# Patient Record
Sex: FEMALE | Race: WHITE | NOT HISPANIC OR LATINO | URBAN - METROPOLITAN AREA
[De-identification: names, ages, dates, MRNs, and addresses within clinical notes are randomized per-mention and may not be internally consistent; named-entity substitution may affect disease eponyms.]

---
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
## 2021-05-29 NOTE — PROGRESS NOTES
Assessment:     1. Dysmenorrhea     2. Encounter for initial prescription of contraceptive pills  Pregnancy, Urine    norgestimate-ethinyl estradiol (ORTHO TRI-CYCLEN LO, 28,) 0.18/0.215/0.25 mg-25 mcg Tab tablet   3. Acne, unspecified acne type               The use of the combined contraceptive ( estrogen and progesterone) has been fully discussed with the patient. This includes the proper method to initiate, the need for regular compliance to ensure adequate contraceptive effect, the physiology that makes the it effective, the instructions for what to do in event of missing pills, and warnings about anticipated minor side effects such as breakthrough spotting, nausea, breast tenderness, weight changes, acne, headaches, etc.    She has been told of the more serious potential side effects such as MI, stroke, and deep vein thrombosis, all of which are very unlikely.  She has been asked to report any signs of such serious problems immediately.. The need for additional protection, such as a condom, to prevent exposure to sexually transmitted diseases has also been discussed. The patient has been clearly reminded that birth control cannot protect her against diseases such as HIV and others. She understands and wishes to take the medication as prescribed.      Patient encouraged to schedule isit for follow-up of birth control method and for WCC/ immunizations , sexually transmitted infection screen if desired.      Patient has delayed immunization as she and family are against immunization after her friend had paralysis after getting immunization  I counseled about related side effects and the benefits about immunization in detail.  Patient will consider.     Plan:      Discussed the diagnosis with the patient.  Educational material distributed.  Discussed non-pharmaceutical approaches to the problem.  I started oral contraceptive pills per orders.  Follow up in 3 months or as needed.     Subjective:     Chief Complaint    Patient presents with     Establish Care     New Pt- here today to establish care with a new provider - received previous care in [place], [place]     Dysmenorrhea     Would like to discuss painful menses     Contraception     Would like to discuss BC options        Emma Salcido is a 18 y.o. female who presents for evaluation of menstrual symptoms. Symptoms began several years ago. Patient describes symptoms of breast tenderness (mild), labile mood (mild), menstrual cramping (moderate) and pelvic pain (moderate). Symptoms occur with periods, which are usually 26 days apart and quite regular. Patient denies bloating/fluid retention, insomnia, menorrhagia and migraine headaches. Evaluation to date includes: GYN evaluation (not done). Treatment to date includes: Oral contraceptive pills per medication list:(NEVER PICKED UP). The patient has never been sexually active.     Has missed school because of dysmenorrhea to the point that she was in the truancy list.  Could not get a note every time she had a painful..  Was prescribed birth control pill at another clinic that she never initiated.    Follows with a psychiatrist and is on Zoloft for anxiety.  The name of the psychiatrist is Dr. Lowry him but does not know the clinic.    Also wants help with acne      The following portions of the patient's history were reviewed and updated as appropriate: allergies, current medications, problem list, past family history, past medical history, past social history, past surgical history and problem list     Allergies   Allergen Reactions     Amoxicillin      Nuts - Unspecified        Current Outpatient Medications on File Prior to Visit   Medication Sig Dispense Refill     sertraline (ZOLOFT) 50 MG tablet TK 1 T PO QAM  0     No current facility-administered medications on file prior to visit.        There is no problem list on file for this patient.      Past Medical History:   Diagnosis Date     Anxiety        History  reviewed. No pertinent surgical history.    Family History   Problem Relation Age of Onset     Aneurysm Mother      Atrial fibrillation Father        Social History     Socioeconomic History     Marital status: Single     Spouse name: None     Number of children: None     Years of education: None     Highest education level: None   Occupational History     None   Social Needs     Financial resource strain: None     Food insecurity:     Worry: None     Inability: None     Transportation needs:     Medical: None     Non-medical: None   Tobacco Use     Smoking status: Never Smoker     Smokeless tobacco: Never Used   Substance and Sexual Activity     Alcohol use: Never     Frequency: Never     Drug use: Never     Sexual activity: Never   Lifestyle     Physical activity:     Days per week: None     Minutes per session: None     Stress: None   Relationships     Social connections:     Talks on phone: None     Gets together: None     Attends Sabianist service: None     Active member of club or organization: None     Attends meetings of clubs or organizations: None     Relationship status: None     Intimate partner violence:     Fear of current or ex partner: None     Emotionally abused: None     Physically abused: None     Forced sexual activity: None   Other Topics Concern     None   Social History Narrative    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    Lives with dad. Works as an . Workday.    Parents are seperated.         Shan Wright MD  6/3/2019             Review of Systems  General:  Denies problem  Eyes: Denies problem  Ears/Nose/Throat: Denies problem  Cardiovascular: Denies problem  Respiratory:  Denies problem  Gastrointestinal:  Denies problem, Genitourinary: Denies problem  Musculoskeletal:  Denies problem  Skin: acne  Neurologic: Denies problem  Psychiatric: follows with psych  Endocrine: Denies problem  Heme/Lymphatic: Denies problem   Allergic/Immunologic: Denies problem     Objective:       "  Vitals:    06/03/19 1149   BP: 105/70   Pulse: 69   Resp: 16   SpO2: 99%   Weight: 133 lb 12.8 oz (60.7 kg)   Height: 5' 7.84\" (1.723 m)       Physical Exam:  General Appearance: Alert, cooperative, no distress, appears stated age  Head: Normocephalic, without obvious abnormality, atraumatic  Eyes: PERRL, conjunctiva/corneas clear, EOM's intact  Ears: Normal TM's and external ear canals, both ears  Nose: Nares normal, septum midline,mucosa normal, no drainage  Throat: Lips, mucosa, and tongue normal; teeth and gums normal  Neck: Supple, symmetrical, trachea midline, no adenopathy;  thyroid: not enlarged, symmetric, no tenderness/mass/nodules; no carotid bruit or JVD  Back: Symmetric, no curvature, ROM normal, no CVA tenderness  Lungs: Clear to auscultation bilaterally, respirations unlabored  Breasts: No breast masses, tenderness, asymmetry, or nipple discharge.  Heart: Regular rate and rhythm, S1 and S2 normal, no murmur, rub, or gallop, Abdomen: Soft, non-tender, bowel sounds active all four quadrants,  no masses, no organomegaly  Pelvic:Not examined  Lymph nodes: Cervical, supraclavicular nodes normal  Neurologic: Normal   Results for orders placed or performed in visit on 06/03/19   Pregnancy, Urine   Result Value Ref Range    Pregnancy Test, Urine Negative Negative          "

## 2021-06-03 VITALS — HEIGHT: 68 IN | WEIGHT: 133.8 LBS | BODY MASS INDEX: 20.28 KG/M2

## 2021-06-17 NOTE — PATIENT INSTRUCTIONS - HE
Patient Instructions by Shan Wright MD at 6/3/2019 11:50 AM     Author: Shan Wright MD Service: -- Author Type: Physician    Filed: 6/3/2019 12:36 PM Encounter Date: 6/3/2019 Status: Addendum    : Shan Wright MD (Physician)    Related Notes: Original Note by Shan Wright MD (Physician) filed at 6/3/2019 12:26 PM           Patient Education     Birth Control: The Pill    Birth control pills contain hormones that help prevent pregnancy. The pills are prescribed by your healthcare provider. There are many types of birth control pills available. If you have side effects from one type of pill, tell your healthcare provider. He or she may be able to prescribe a pill that works better for you.  Pregnancy rates  Talk to your healthcare provider about the effectiveness of this birth control method.  Using the pill    Take one pill daily. Take it at around the same time each day.    Follow your healthcare providers guidelines on when to start your first pack of pills. You may need to use another form of birth control for a week or more after you start.    Know what to do if you forget to take a pill. (Consult your healthcare provider or check the package.) If you miss more than one pill, you may need to use a backup method of birth control for a week or more.  Pros    Low pregnancy rate    No interruption to sex    Easy to use    Can help make periods more regular    May lower your risk of ovarian cysts and certain cancers    May decrease menstrual cramps, menstrual flow, and acne  Cons    Does not protect against sexually transmitted infection (STIs)    Requires taking a pill on time each day    May not work as well when taken with certain other medicines (check with your pharmacist)    May cause side effects such as nausea, irregular bleeding, headaches, breast tenderness, fatigue, or mood changes (these often go away within 3 months)    May increase the risk of blood clots, heart attack,  and stroke  The pill may not be for you  The pill may not be for you if:    You are a smoker and over age 35    You have high blood pressure or gallbladder, liver, cerebrovascular  or heart disease    You have diabetes, migraines, blood clot in the vein or artery, lupus, depression, certain lipid disorders, or take medicines that interfere with the pill  In these cases, discuss the risks with your healthcare provider.  Date Last Reviewed: 3/1/2017    4311-8366 [employer]. [address] [place] [address]. All rights reserved. This information is not intended as a substitute for professional medical care. Always follow your healthcare professional's instructions.           From up-to-date-regarding starting birth control pills    Initiation -- COCs can be started any time during the cycle  as long as pregnancy is reasonably excluded ,  here are several options for starting the pill.  ?We use the quick start method in which the woman begins taking COCs on the day that she is given the prescription . This approach is evidence-based.  ?An alternative is the Sunday start approach where the woman starts the pill on the first Sunday after her period begins. The patient should be informed that a Sunday start avoids withdrawal bleeding on a weekend, and she can decide if that is a priority.   With these first two options, the pill is often started >5 days after the onset of menses. When this occurs, we recommend backup contraception for the first seven days of the cycle This is because follicular development and breakthrough ovulation are more common in women who delay starting the pill after menses when compared with women who start on day 1 of menses If the patient is concerned about the possibility of pregnancy, she should be instructed to check a pregnancy test at home or in the office two weeks after initiating COCs. Patients can be reassured that any exposure to COCs will not adversely affect an  early pregnancy or cause birth defects